# Patient Record
Sex: FEMALE | Race: WHITE | Employment: UNEMPLOYED | URBAN - METROPOLITAN AREA
[De-identification: names, ages, dates, MRNs, and addresses within clinical notes are randomized per-mention and may not be internally consistent; named-entity substitution may affect disease eponyms.]

---
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
## 2025-02-11 ENCOUNTER — HOSPITAL ENCOUNTER (OUTPATIENT)
Dept: GENERAL RADIOLOGY | Age: 20
Discharge: HOME | End: 2025-02-13
Payer: COMMERCIAL

## 2025-02-11 ENCOUNTER — HOSPITAL ENCOUNTER (OUTPATIENT)
Age: 20
End: 2025-02-11
Payer: COMMERCIAL

## 2025-02-11 ENCOUNTER — HOSPITAL ENCOUNTER (OUTPATIENT)
Age: 20
Discharge: HOME | End: 2025-02-13
Payer: COMMERCIAL

## 2025-02-11 DIAGNOSIS — M25.532 LEFT WRIST PAIN: ICD-10-CM

## 2025-02-11 PROCEDURE — 73130 X-RAY EXAM OF HAND: CPT

## 2025-02-11 PROCEDURE — 73100 X-RAY EXAM OF WRIST: CPT

## 2025-02-11 PROCEDURE — 73090 X-RAY EXAM OF FOREARM: CPT

## 2025-03-25 ENCOUNTER — HOSPITAL ENCOUNTER (OUTPATIENT)
Dept: OCCUPATIONAL THERAPY | Age: 20
Setting detail: THERAPIES SERIES
Discharge: HOME | End: 2025-03-25
Payer: COMMERCIAL

## 2025-03-25 PROCEDURE — 97110 THERAPEUTIC EXERCISES: CPT

## 2025-03-25 PROCEDURE — 97166 OT EVAL MOD COMPLEX 45 MIN: CPT

## 2025-03-25 PROCEDURE — 97112 NEUROMUSCULAR REEDUCATION: CPT

## 2025-03-25 PROCEDURE — 97035 APP MDLTY 1+ULTRASOUND EA 15: CPT

## 2025-03-25 NOTE — PLAN OF CARE
Phone: [phone]                      [facility]           Fax: [phone]                           Outpatient Occupational Therapy                                                                            Initial Evaluation      Name:  Anne Davis YOB: 2005  Date: 3/25/2025  Referring Physician: Italo Ng MD   Medical Diagnosis: Diagnosis: M77.8 - extensor tendinitis of hand  Rehab Diagnosis/ICD-10#s: Weakness, M62.81; M25.521 - Pain in right elbow M25.522 - Pain in left elbow   Insurance: OT Insurance Information: [employer] [place]  Total # of Visits to Date: 1  Next  Appointment: 25  Chief Complaint: B medial elbow pain, forearm pain, and wrist pain  CSN #: [id number]  Onset Date: 25    Mechanism of Injury: Overuse / EDS     Precautions:   [x]None  [] Fall Risk  []WB Status  [] Pacemaker   []Other:            Involved Extremity:      [x] Left [x] Right  Dominant: [] Left [x]Right    Work Status:   [] Normal [] Restricted [] Off D/T Injury/Condition [] Retired [] Unemployed [] Disabled [x]Other:  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 Patient states that she was dx initially with neuritis and tendinitis. Patient stated that ice does not help and that she does have wrist cock up splints, however, she just doesn't use her hands and isn't able to engage in daily tasks that she needs to with them on. Patient states that pain is described as sharp pain from medial elbow to digits, bilaterally. Patient states that L is worse than the R. 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    Pain: Intensity:   6/10 Location:   B medial elbow down volar ulnar forearm do digits 3-5, L >

## 2025-03-27 ENCOUNTER — APPOINTMENT (OUTPATIENT)
Dept: OCCUPATIONAL THERAPY | Age: 20
End: 2025-03-27
Payer: COMMERCIAL

## 2025-03-28 ENCOUNTER — HOSPITAL ENCOUNTER (OUTPATIENT)
Dept: OCCUPATIONAL THERAPY | Age: 20
Setting detail: THERAPIES SERIES
Discharge: HOME | End: 2025-03-28
Payer: COMMERCIAL

## 2025-03-28 PROCEDURE — 97035 APP MDLTY 1+ULTRASOUND EA 15: CPT

## 2025-03-28 PROCEDURE — 97110 THERAPEUTIC EXERCISES: CPT

## 2025-03-28 PROCEDURE — 97140 MANUAL THERAPY 1/> REGIONS: CPT

## 2025-03-28 NOTE — PROGRESS NOTES
Rotation  ___Tenting           GOALS   Time Frame for Long Term Goals : 6 weeks     Long Term Goal 1: Patient to state <20% impairment throughout daily tasks, as measured by the DASH. continue []Met  [x]Partially met  []Not met   Long Term Goal 2: Patient to state pain <3/10 at the worst B hand, wrist and elbows to improve independence and overall QOL. continue     []Met  [x]Partially met  []Not met   Long Term Goal 3: Patient to improve R wrist MMT to 4+/5,  in standard testing position, pronation testing position and supination testing position to >70# to improve strength for I/ADL. continue []Met  [x]Partially met  []Not met   Long Term Goal 4: Patient to improve L wrist MMT to 4+/5,  in standard testing position, pronation testing position and supination testing position to >50# with pain <3/10 with testing to improve strength for I/ADL. continue []Met  [x]Partially met  []Not met         Time Frame for Short Term Goals: 5 weeks     Short Term Goal 1: Patient to be educated on HEP to improve B hand/wrist strength, joint stability and pain. continue []Met  [x]Partially met  []Not met   Short Term Goal 2: The patient will be educated on joint protection strategies in daily tasks  to reduce pain and prevent injury. continue []Met  [x]Partially met  []Not met                     ADDITIONAL COMMENTS       EDUCATION  New Education provided to patient/family/caregiver:    []Yes:     [x]No (Continued review of prior education)   If yes Education Provided:     Method of Education:     [x]Discussion     []Demonstration    [] Written     []Other  Evaluation of Patient’s Response to Education:         [x]Patient and or caregiver verbalized understanding  []Patient and or Caregiver Demonstrated without assistance   []Patient and or Caregiver Demonstrated with assistance  []Needs additional instruction to demonstrate understanding of education    ASSESSMENT  Patient tolerated today’s treatment session:    [x] Good

## 2025-04-01 ENCOUNTER — HOSPITAL ENCOUNTER (OUTPATIENT)
Dept: OCCUPATIONAL THERAPY | Age: 20
Setting detail: THERAPIES SERIES
Discharge: HOME | End: 2025-04-01
Payer: COMMERCIAL

## 2025-04-01 PROCEDURE — 97035 APP MDLTY 1+ULTRASOUND EA 15: CPT

## 2025-04-01 PROCEDURE — 97140 MANUAL THERAPY 1/> REGIONS: CPT

## 2025-04-01 NOTE — PROGRESS NOTES
trigger points to...   ___No manipulation/static  ___Basic Manipulation  ___Pistoning Manipulation  ___Needle Rotation  ___Tenting                    GOALS   Time Frame for Long Term Goals : 6 weeks       Long Term Goal 1: Patient to state <20% impairment throughout daily tasks, as measured by the DASH. continue []Met  [x]Partially met  []Not met   Long Term Goal 2: Patient to state pain <3/10 at the worst B hand, wrist and elbows to improve independence and overall QOL. continue       []Met  [x]Partially met  []Not met   Long Term Goal 3: Patient to improve R wrist MMT to 4+/5,  in standard testing position, pronation testing position and supination testing position to >70# to improve strength for I/ADL. continue []Met  [x]Partially met  []Not met   Long Term Goal 4: Patient to improve L wrist MMT to 4+/5,  in standard testing position, pronation testing position and supination testing position to >50# with pain <3/10 with testing to improve strength for I/ADL. continue []Met  [x]Partially met  []Not met          Time Frame for Short Term Goals: 5 weeks       Short Term Goal 1: Patient to be educated on HEP to improve B hand/wrist strength, joint stability and pain. continue []Met  [x]Partially met  []Not met   Short Term Goal 2: The patient will be educated on joint protection strategies in daily tasks  to reduce pain and prevent injury. continue []Met  [x]Partially met  []Not met   ADDITIONAL COMMENTS          EDUCATION  New Education provided to patient/family/caregiver:    []Yes:     [x]No (Continued review of prior education)   If yes Education Provided:      Method of Education:     [x]Discussion     []Demonstration    [] Written     []Other  Evaluation of Patient’s Response to Education:         [x]Patient and or caregiver verbalized understanding  []Patient and or Caregiver Demonstrated without assistance           []Patient and or Caregiver Demonstrated with assistance  []Needs additional

## 2025-04-03 ENCOUNTER — HOSPITAL ENCOUNTER (OUTPATIENT)
Dept: OCCUPATIONAL THERAPY | Age: 20
Setting detail: THERAPIES SERIES
Discharge: HOME | End: 2025-04-03
Payer: COMMERCIAL

## 2025-04-03 PROCEDURE — 97140 MANUAL THERAPY 1/> REGIONS: CPT

## 2025-04-03 PROCEDURE — 97014 ELECTRIC STIMULATION THERAPY: CPT

## 2025-04-03 PROCEDURE — 97124 MASSAGE THERAPY: CPT

## 2025-04-03 NOTE — PROGRESS NOTES
Phone: [phone]                 [facility]    Fax: [phone]                       Outpatient Occupational Therapy                 DAILY TREATMENT NOTE    Date: 4/3/2025  Patient’s Name:  Anne Davis  YOB: 2005 (19 y.o.)  Gender:  female  MRN:  [id number]  [facility] #: [id number]  Medical Diagnosis: M77.8 - extensor tendinitis of hand    Referring Physician: Italo Ng MD     INSURANCE  OT Insurance Information: [employer] [place]       Total # of Visits to Date: 4       PAIN  []No     [x]Yes      Location:   Pain Rating (0-10 pain scale): 4/10  Pain Description:     SUBJECTIVE         Flow Sheet   Exercise &   Manual treatment Weight/  Level Reps/Time Comments    cupping x x B elbows along ulnar path, static and dynamic    Free weight     3 planes ABIMAEL wrists for strengthening    digiflex     Abimael hand strengthening    Power web      Flex/ext MCPs   PROM x x B elbow, coupled movements   Joint mobilizations x   x B radial ulnar joint and humeral ulnar joint    Manual massage x 2' each x 4 B Medial elbows    IASTM  x x  B medial elbows                                                      All therapeutic exercises and manual treatment completed to improve ROM and functional use of affected extremity.    Therapeutic Activities / NMR Time  Task                                                            Modality Flow Sheet:   START STOP Tx Modality      8' each elbow Electrical Stim: IFC completed for pain to medial ABIMAEL elbows. Pt tolerated fair w skin intact pre and post.          Ultrasound: __1.2_ W/cm2 x _8__ mins  Duty factor: __x100%  __50%  __20% __10%  Head size:   MHz: __1mHz __2 mHz  _x_3mHz  Location: ABIMAEL elbows on medial side to relieve pain. Pt tolerated well with skin intact pre and post.        Hot Pack:       Paraffin:       Cold Pack:       Vasopneumatic Compression with Ice to alleviate pain and/or edema of        Dry Needling: ___\" needle to superficial radial, deep radial and

## 2025-04-10 ENCOUNTER — HOSPITAL ENCOUNTER (OUTPATIENT)
Dept: OCCUPATIONAL THERAPY | Age: 20
Setting detail: THERAPIES SERIES
Discharge: HOME | End: 2025-04-10
Payer: COMMERCIAL

## 2025-04-10 NOTE — PROGRESS NOTES
[facility]  Inpatient/Observation/Outpatient Rehabilitation    Date: 4/10/2025  Patient Name: Anne Davis       [] Inpatient Acute/Observation       [x]  Outpatient  : 2005     Plan of Care/Recert ends      [] Pt refused/declined therapy at this time due to:           [x] Pt cancelled due to:  [x] No Reason Given   [] Sick/ill   [] Other:      [] Evaluation held by RN/Provider/Physical Therapist due to:    [] High Heart Rate   [] High Blood Pressure   [] Orthopedic Consult   [] Hgb < 7   [] Other:    [] Pt ordered brace per physician request:  [] Proper fit will be completed and education for wearing/skin checks    [] Pt does not require skilled services due to:      Therapist/Assistant will attempt to see this patient, at our earliest opportunity.       Antwan Lewis Date: 4/10/2025

## 2025-04-10 NOTE — PROGRESS NOTES
Phone: [phone]                 [facility]    Fax: [phone]                       Outpatient Occupational Therapy                 DAILY TREATMENT NOTE    [facility]  Inpatient/Observation/Outpatient Rehabilitation    Date: 4/10/2025  Patient Name: Anne Davis       [] Inpatient Acute/Observation       [x]  Outpatient  : 2005         [x] Pt cancelled due to:  [x] No Reason Given   [] Sick/ill   [] Other:          TWILA Lee Date: 4/10/2025

## 2025-04-15 ENCOUNTER — HOSPITAL ENCOUNTER (OUTPATIENT)
Dept: OCCUPATIONAL THERAPY | Age: 20
Setting detail: THERAPIES SERIES
Discharge: HOME | End: 2025-04-15
Payer: COMMERCIAL

## 2025-04-15 PROCEDURE — 97018 PARAFFIN BATH THERAPY: CPT

## 2025-04-15 PROCEDURE — 97140 MANUAL THERAPY 1/> REGIONS: CPT

## 2025-04-15 NOTE — PROGRESS NOTES
Phone: [phone]                 [facility]    Fax: [phone]                       Outpatient Occupational Therapy                 DAILY TREATMENT NOTE    Date: 4/15/2025  Patient’s Name:  Anne Davis  YOB: 2005 (20 y.o.)  Gender:  female  MRN:  [id number]  [facility] #: [id number]  Medical Diagnosis: M77.8 - extensor tendinitis of hand    Referring Physician: Italo Ng MD     INSURANCE  OT Insurance Information: Med [place]       Total # of Visits to Date: 5       PAIN  []No     [x]Yes      Location: B elbows, L > R  Pain Rating (0-10 pain scale): 7/10  Pain Description:     SUBJECTIVE   Reports pain 2/10 post tx.        Flow Sheet   Exercise &   Manual treatment Weight/  Level Reps/Time Comments    cupping   B elbows along ulnar path, static and dynamic    Free weight     3 planes ABIMAEL wrists for strengthening    digiflex     Abiamel hand strengthening    Power web      Flex/ext MCPs   PROM x x B elbow - followed epicondylitis protocol   Joint mobilizations   B radial ulnar joint and humeral ulnar joint    Manual massage x 2' each x 4 B Medial elbows    IASTM x x Vibration - flexor tendons                                                     All therapeutic exercises and manual treatment completed to improve ROM and functional use of affected extremity.    Therapeutic Activities / NMR Time  Task                                                            Modality Flow Sheet:   START STOP Tx Modality       Electrical Stim: IFC completed for pain to medial ABIMAEL elbows. Pt tolerated fair w skin intact pre and post.           Ultrasound: __1.2_ W/cm2 x _8__ mins  Duty factor: __x100%  __50%  __20% __10%  Head size:   MHz: __1mHz __2 mHz  _x_3mHz  Location: ABIMAEL elbows on medial side to relieve pain. Pt tolerated well with skin intact pre and post.       10 minutes Hot Pack: B elbow for pain. Combined c paraffin. Patient tolerated well c skin intact pre and post treatment.      10 minutes

## 2025-04-17 ENCOUNTER — HOSPITAL ENCOUNTER (OUTPATIENT)
Dept: OCCUPATIONAL THERAPY | Age: 20
Setting detail: THERAPIES SERIES
Discharge: HOME | End: 2025-04-17
Payer: COMMERCIAL

## 2025-04-17 PROCEDURE — 97140 MANUAL THERAPY 1/> REGIONS: CPT

## 2025-04-17 PROCEDURE — 97018 PARAFFIN BATH THERAPY: CPT

## 2025-04-17 NOTE — PROGRESS NOTES
skin intact pre and post treatment.      10 minutes Paraffin: B elbow for pain. Combined c HP. Patient tolerated well c skin intact pre and post treatment.       Cold Pack:       Vasopneumatic Compression with Ice to alleviate pain and/or edema of        Dry Needling: ___\" needle to superficial radial, deep radial and  lataeral antebrachial cutaneous at homeostatic neuro- trigger points to...   ___No manipulation/static  ___Basic Manipulation  ___Pistoning Manipulation  ___Needle Rotation  ___Tenting                       GOALS   Time Frame for Long Term Goals : 6 weeks       Long Term Goal 1: Patient to state <20% impairment throughout daily tasks, as measured by the DASH. continue []Met  [x]Partially met  []Not met   Long Term Goal 2: Patient to state pain <3/10 at the worst B hand, wrist and elbows to improve independence and overall QOL. continue       []Met  [x]Partially met  []Not met   Long Term Goal 3: Patient to improve R wrist MMT to 4+/5,  in standard testing position, pronation testing position and supination testing position to >70# to improve strength for I/ADL. Continue    R 4-/5*  See below []Met  [x]Partially met  []Not met   Long Term Goal 4: Patient to improve L wrist MMT to 4+/5,  in standard testing position, pronation testing position and supination testing position to >50# with pain <3/10 with testing to improve strength for I/ADL. Continue  L 4-/5*  See below []Met  [x]Partially met  []Not met           Time Frame for Short Term Goals: 5 weeks       Short Term Goal 1: Patient to be educated on HEP to improve B hand/wrist strength, joint stability and pain. continue []Met  [x]Partially met  []Not met   Short Term Goal 2: The patient will be educated on joint protection strategies in daily tasks  to reduce pain and prevent injury. continue []Met  [x]Partially met  []Not met   ADDITIONAL COMMENTS           /PINCH STRENGTH  (measured in #) RIGHT LEFT    - standard 62* 41*    -

## 2025-04-18 ENCOUNTER — TRANSCRIBE ORDERS (OUTPATIENT)
Dept: ADMINISTRATIVE | Age: 20
End: 2025-04-18

## 2025-04-18 DIAGNOSIS — M25.522 ELBOW PAIN, LEFT: Primary | ICD-10-CM

## 2025-04-18 DIAGNOSIS — M25.521 ELBOW PAIN, RIGHT: Primary | ICD-10-CM

## 2025-04-22 ENCOUNTER — HOSPITAL ENCOUNTER (OUTPATIENT)
Dept: OCCUPATIONAL THERAPY | Age: 20
Setting detail: THERAPIES SERIES
Discharge: HOME | End: 2025-04-22
Payer: COMMERCIAL

## 2025-04-22 PROCEDURE — 97018 PARAFFIN BATH THERAPY: CPT

## 2025-04-22 PROCEDURE — 97140 MANUAL THERAPY 1/> REGIONS: CPT

## 2025-04-22 NOTE — PROGRESS NOTES
Phone: [phone]                 [facility]    Fax: [phone]                       Outpatient Occupational Therapy                 DAILY TREATMENT NOTE    Date: 4/22/2025  Patient’s Name:  Anne Davis  YOB: 2005 (20 y.o.)  Gender:  female  MRN:  [id number]  [facility] #: [id number]  Medical Diagnosis: M77.8 - extensor tendinitis of hand    Referring Physician: Italo Ng MD     INSURANCE  OT Insurance Information: Med [place]       Total # of Visits to Date: 7       PAIN  []No     [x]Yes      Location: B forearms and elbows  Pain Rating (0-10 pain scale): 5/10  Pain Description:     SUBJECTIVE   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    Flow Sheet   Exercise &   Manual treatment Weight/  Level Reps/Time Comments    cupping     B elbows along ulnar path, static and dynamic    Free weight     3 planes ABIMAEL wrists for strengthening    digiflex     Abimael hand strengthening    Power web      Flex/ext MCPs   PROM x x B elbow - followed epicondylitis protocol   Joint mobilizations     B radial ulnar joint and humeral ulnar joint    Manual massage x 2' each x 4 B Medial elbows    IASTM x x Vibration - flexor tendons    k-tape x x B forearms from wrist > elbows for medial elbow and forearm pain                                           All therapeutic exercises and manual treatment completed to improve ROM and functional use of affected extremity.    Therapeutic Activities / NMR Time  Task                                                            Modality Flow Sheet:   START STOP Tx Modality       Electrical Stim: IFC completed for pain to medial ABIMAEL elbows. Pt tolerated fair w skin intact pre and post.           Ultrasound: __1.2_ W/cm2 x _8__ mins  Duty factor: __x100%  __50%  __20% __10%  Head size:   MHz: __1mHz __2 mHz  _x_3mHz  Location: ABIMAEL elbows on medial side to relieve pain. Pt tolerated well with skin intact pre and post.       10 minutes Hot Pack: B elbow for

## 2025-04-24 ENCOUNTER — HOSPITAL ENCOUNTER (OUTPATIENT)
Dept: OCCUPATIONAL THERAPY | Age: 20
Setting detail: THERAPIES SERIES
Discharge: HOME | End: 2025-04-24
Payer: COMMERCIAL

## 2025-04-24 NOTE — PROGRESS NOTES
[facility]  Inpatient/Observation/Outpatient Rehabilitation    Date: 2025  Patient Name: Anne Davis       [] Inpatient Acute/Observation       [x]  Outpatient  : 2005       [] Pt refused/declined therapy at this time due to:           [x] Pt cancelled due to:  [x] No Reason Given   [] Sick/ill   [] Other:      [] Evaluation held by RN/Provider/Physical Therapist due to:    [] High Heart Rate   [] High Blood Pressure   [] Orthopedic Consult   [] Hgb < 7   [] Other:    [] Pt ordered brace per physician request:  [] Proper fit will be completed and education for wearing/skin checks    [] Pt does not require skilled services due to:      Therapist/Assistant will attempt to see this patient, at our earliest opportunity.       Magdalena Prieto Date: 2025

## 2025-04-29 ENCOUNTER — HOSPITAL ENCOUNTER (OUTPATIENT)
Dept: OCCUPATIONAL THERAPY | Age: 20
Setting detail: THERAPIES SERIES
Discharge: HOME | End: 2025-04-29
Payer: COMMERCIAL

## 2025-04-29 NOTE — PROGRESS NOTES
[facility]  Inpatient/Observation/Outpatient Rehabilitation    Date: 2025  Patient Name: Anne Davis       [] Inpatient Acute/Observation       [x]  Outpatient  : 2005      Plan of Care/Recert ends      [] Pt refused/declined therapy at this time due to:           [x] Pt cancelled due to:  [x] No Reason Given   [] Sick/ill   [] Other:      [] Evaluation held by RN/Provider/Physical Therapist due to:    [] High Heart Rate   [] High Blood Pressure   [] Orthopedic Consult   [] Hgb < 7   [] Other:    [] Pt ordered brace per physician request:  [] Proper fit will be completed and education for wearing/skin checks    [] Pt does not require skilled services due to:      Therapist/Assistant will attempt to see this patient, at our earliest opportunity.       Antwan Lewis Date: 2025

## 2025-04-30 ENCOUNTER — APPOINTMENT (OUTPATIENT)
Dept: OCCUPATIONAL THERAPY | Age: 20
End: 2025-04-30
Payer: COMMERCIAL

## 2025-05-29 ENCOUNTER — OFFICE VISIT (OUTPATIENT)
Dept: FAMILY MEDICINE CLINIC | Age: 20
End: 2025-05-29
Payer: COMMERCIAL

## 2025-05-29 VITALS
OXYGEN SATURATION: 98 % | HEART RATE: 60 BPM | DIASTOLIC BLOOD PRESSURE: 78 MMHG | BODY MASS INDEX: 19.93 KG/M2 | SYSTOLIC BLOOD PRESSURE: 110 MMHG | WEIGHT: 124 LBS | HEIGHT: 66 IN

## 2025-05-29 DIAGNOSIS — Z00.00 ANNUAL PHYSICAL EXAM: Primary | ICD-10-CM

## 2025-05-29 DIAGNOSIS — N94.6 DYSMENORRHEA: ICD-10-CM

## 2025-05-29 DIAGNOSIS — Q79.62 EHLERS-DANLOS SYNDROME TYPE III: ICD-10-CM

## 2025-05-29 PROCEDURE — 99385 PREV VISIT NEW AGE 18-39: CPT | Performed by: FAMILY MEDICINE

## 2025-05-29 ASSESSMENT — ENCOUNTER SYMPTOMS
CONSTIPATION: 0
BLOOD IN STOOL: 0
DIARRHEA: 0
VOMITING: 0
COUGH: 0
ABDOMINAL PAIN: 0
EYE DISCHARGE: 0
NAUSEA: 0
EYE REDNESS: 0
SHORTNESS OF BREATH: 0

## 2025-05-29 NOTE — PROGRESS NOTES
HPI Notes    Name: Anne Davis  : 2005        Chief Complaint:     Chief Complaint   Patient presents with    Annual Exam     Patient here today for wellness visit. New patient to our office.     Menstrual Cramps     Patient takes patch for her menses. Works well for her. Patient uses OCP patch. She said she has not really had a menses for 7 months.    Establish Care     Patient new to our office. Was seeing a patient at pediatric office.       History of Present Illness:     Anne Davis is a 20 y.o.  female who presents with Annual Exam (Patient here today for wellness visit. New patient to our office. ), Menstrual Cramps (Patient takes patch for her menses. Works well for her. Patient uses OCP patch. She said she has not really had a menses for 7 months.), and Establish Care (Patient new to our office. Was seeing a patient at pediatric office.)      [facility]  Annual exam - pt is here for annual exam to get established as pt went to a [facility] [address] for years. 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 Pt is other wise healthy and no new concerns.  Pt has take NSAIDS and had OT for her wrist pain in the past again Sarita Danlos Dx. Pt has learned to manage as best she can.    Dysmenorrhea - pt has very bad cramping after first year of menses where she only had 4 menses. So pt has been on the hormone patches for several years continuous to prevent menses and that is only thing that helps. Once she ran out of patches and got her menses and again very bad cramps -- so bad used to miss school. So pt will stay on patch continuous for now.     Past Medical History:     Past Medical History:   Diagnosis Date    Lymphadenitis 2021      Reviewed all health maintenance requirements and ordered appropriate tests  Health Maintenance Due   Topic Date Due    HIV

## 2025-05-29 NOTE — PATIENT INSTRUCTIONS
SURVEY:    You may be receiving a survey from [employer] regarding your visit today.    Please complete the survey to enable us to provide the highest quality of care to you and your family.      Thank you.    Clinical Care Team: MD Matheus Peña LPN              Triage: Pratibha Gonzalez CMA              Clerical Team: Pratibha Nath

## 2025-09-03 ENCOUNTER — OFFICE VISIT (OUTPATIENT)
Dept: FAMILY MEDICINE CLINIC | Age: 20
End: 2025-09-03
Payer: COMMERCIAL

## 2025-09-03 ENCOUNTER — HOSPITAL ENCOUNTER (OUTPATIENT)
Dept: GENERAL RADIOLOGY | Age: 20
Discharge: HOME | End: 2025-09-05
Payer: COMMERCIAL

## 2025-09-03 VITALS
SYSTOLIC BLOOD PRESSURE: 110 MMHG | DIASTOLIC BLOOD PRESSURE: 70 MMHG | BODY MASS INDEX: 19.85 KG/M2 | WEIGHT: 123 LBS | HEART RATE: 68 BPM | OXYGEN SATURATION: 98 %

## 2025-09-03 DIAGNOSIS — M25.562 ACUTE PAIN OF BOTH KNEES: Primary | ICD-10-CM

## 2025-09-03 DIAGNOSIS — M25.561 ACUTE PAIN OF BOTH KNEES: ICD-10-CM

## 2025-09-03 DIAGNOSIS — Q79.62 EHLERS-DANLOS SYNDROME TYPE III: ICD-10-CM

## 2025-09-03 DIAGNOSIS — F41.9 ANXIETY: ICD-10-CM

## 2025-09-03 DIAGNOSIS — M25.562 ACUTE PAIN OF BOTH KNEES: ICD-10-CM

## 2025-09-03 DIAGNOSIS — M25.561 ACUTE PAIN OF BOTH KNEES: Primary | ICD-10-CM

## 2025-09-03 PROCEDURE — 73564 X-RAY EXAM KNEE 4 OR MORE: CPT

## 2025-09-03 PROCEDURE — 99214 OFFICE O/P EST MOD 30 MIN: CPT | Performed by: FAMILY MEDICINE

## 2025-09-03 PROCEDURE — G8420 CALC BMI NORM PARAMETERS: HCPCS | Performed by: FAMILY MEDICINE

## 2025-09-03 PROCEDURE — G8427 DOCREV CUR MEDS BY ELIG CLIN: HCPCS | Performed by: FAMILY MEDICINE

## 2025-09-03 PROCEDURE — 1036F TOBACCO NON-USER: CPT | Performed by: FAMILY MEDICINE

## 2025-09-03 RX ORDER — CITALOPRAM HYDROBROMIDE 10 MG/1
10 TABLET ORAL DAILY
Qty: 30 TABLET | Refills: 1 | Status: SHIPPED | OUTPATIENT
Start: 2025-09-03

## 2025-09-05 ASSESSMENT — ENCOUNTER SYMPTOMS
DIARRHEA: 0
SHORTNESS OF BREATH: 0
VOMITING: 0